# Patient Record
(demographics unavailable — no encounter records)

---

## 2024-11-15 NOTE — HISTORY OF PRESENT ILLNESS
[de-identified] : Mr. MCKEON is a 43 year y/o M with no significant PMH who presents as a new patient today to establish care. CC R knee pain  #R Knee Pain Used to play soccer a lot, had multiple injuries to R lateral meniscus Meniscus started to break, removed part of it to try to preserve it After 3rd surgery stopped playing soccer Has been doing fine when he doesn't play soccer Runs for 20-30 minutes This week after a yoga class he is having pain in the medial knee which is thinks is the medial meniscus No trauma Has been the same since Sunday, neither worsening nor improving The longer he stays seated, the worse it is when he gets up Has not tried any medications or treatments Feels pain at the intersection between the bones ROM is not limited, he is weight bearing and walking normally   PMH: none PSH: R knee surgeries - meniscus x 3 (2004, 2014)  Allergies: NKDA Medications: none, took some vitamin D last year Fam Hx: mom HLD Social History: moved from [place] 5 years ago Lives alone Works as finance  Tobacco use: never Alcohol use: none Drug use: none Sexually active: not currently, will do STI testing today Diet & Exercise: 3 x/week gyn Mood: tired but not depressed Firearms: N  #Health Maintenance Flu shot: not sure Covid vaccine: not sure Tdap: thinks UTD STI screen: will do today

## 2024-11-15 NOTE — REVIEW OF SYSTEMS
[Joint Pain] : joint pain [Joint Stiffness] : no joint stiffness [Muscle Pain] : no muscle pain [Muscle Weakness] : no muscle weakness [Back Pain] : no back pain [Joint Swelling] : no joint swelling

## 2024-11-15 NOTE — HEALTH RISK ASSESSMENT
[No] : No [0] : 2) Feeling down, depressed, or hopeless: Not at all (0) [PHQ-2 Negative - No further assessment needed] : PHQ-2 Negative - No further assessment needed [Never] : Never [NO] : No

## 2024-11-15 NOTE — PHYSICAL EXAM
[No Acute Distress] : no acute distress [Well-Appearing] : well-appearing [Normal Sclera/Conjunctiva] : normal sclera/conjunctiva [EOMI] : extraocular movements intact [Normal Outer Ear/Nose] : the outer ears and nose were normal in appearance [Normal Oropharynx] : the oropharynx was normal [No Lymphadenopathy] : no lymphadenopathy [Supple] : supple [Normal] : normal rate, regular rhythm, normal S1 and S2 and no murmur heard [No Edema] : there was no peripheral edema [No Extremity Clubbing/Cyanosis] : no extremity clubbing/cyanosis [Non-distended] : non-distended [Normal Bowel Sounds] : normal bowel sounds [Normal Posterior Cervical Nodes] : no posterior cervical lymphadenopathy [Normal Anterior Cervical Nodes] : no anterior cervical lymphadenopathy [No Joint Swelling] : no joint swelling [Grossly Normal Strength/Tone] : grossly normal strength/tone [Coordination Grossly Intact] : coordination grossly intact [Normal Gait] : normal gait [Normal Affect] : the affect was normal [Normal Insight/Judgement] : insight and judgment were intact [de-identified] : mild tenderness to deep palpation of inferior-medial knee on R side. FROM, strength and sensation intact.

## 2024-11-15 NOTE — ASSESSMENT
[FreeTextEntry1] : 42 y/o M with h/o torn meniscus s/p repair x 3 presents to establish care and with CC of R new pain x 5 days.  #R Knee Pain H/o multiple surgeries to R lateral meniscus as a . 5 days ago did a yoga class with no known trauma or injury, but since then with medial R knee pain. No effusion, warmth, swelling or skin changes. FROM, has been able to bear weight and walk normally. Pt requests MRI to evaluate his medial meniscus.   #HM Due for flu and covid, will think about it. Ok with labs and STI screen today. UTD on tdap.

## 2024-11-15 NOTE — PLAN
[FreeTextEntry1] : #R Knee Pain - Ortho referral - Supportive care with NSAIDs, heat, ice  #HM - Labs and STI screen today

## 2024-12-04 NOTE — PHYSICAL EXAM
[de-identified] : Right knee  Constitutional:  The patient is healthy-appearing and in no apparent distress.   Gait: The patient ambulates with a normal gait and no limp.  Cardiovascular System:  The capillary refill is less than 2 seconds.   Skin:  There are no skin abnormalities.  Right Knee:   Bony Palpation:  There is tenderness of the medial joint line.  There is no tenderness of the lateral joint line. There is no tenderness of the medial femoral chondyle. There is no tenderness of the lateral femoral chondyle. There is no tenderness of the tibial tubercle. There is no tenderness of the superior patella. There is no tenderness of the inferior patella. There is tenderness of the medial patellar facet. There is tenderness of the lateral patellar facet.  Soft Tissue Palpation:  There is no tenderness of the medial retinaculum. There is no tenderness of the lateral retinaculum. There is no tenderness of the quadriceps tendon. There is no tenderness of the patella tendon. There is no tenderness of the ITB. There is no tenderness of the pes anserine.  Active Range of Motion:  The range of motion at the knee actively and passively is full.   Special Tests:  There is a negative Apley. There is a negative Steinmanns.  There is a negative Lachman and Anterior Drawer. There is a negative Posterior Drawer.   There is no varus or valgus laxity.  Strength:  There is 5/5 hip flexion and 5/5 knee flexion and extension.    Psychiatric:  The patient demonstrates a normal mood and affect and is active and alert   [de-identified] : X-ray right knee: There is mild to moderate lateral and mild to moderate patellofemoral arthritis

## 2024-12-04 NOTE — HISTORY OF PRESENT ILLNESS
[de-identified] : Initial visit: right knee pain Reason: no falls or injuries  Duration:4 WEEKS Prior studies: xray order Symptoms thightness and throbbing  Aggravating Fx: walking up the stairs or runnning  Alleviating Fx: Pain level: 7/10 Pain medication: no Medical Hx:no Surgical Hx: right knee meniscus repair (2013, 2010,2003) Current Medication: no Allergies: no

## 2024-12-04 NOTE — ASSESSMENT
[FreeTextEntry1] : Discussed at length with patient exam history imaging as well as treatment options at this time patient elects home exercises and would like to try viscosupplementation which he states in the past he has used with some relief